# Patient Record
Sex: FEMALE | Race: BLACK OR AFRICAN AMERICAN | NOT HISPANIC OR LATINO | URBAN - METROPOLITAN AREA
[De-identification: names, ages, dates, MRNs, and addresses within clinical notes are randomized per-mention and may not be internally consistent; named-entity substitution may affect disease eponyms.]

---

## 2023-10-07 ENCOUNTER — EMERGENCY (EMERGENCY)
Facility: HOSPITAL | Age: 33
LOS: 1 days | Discharge: HOME | End: 2023-10-07
Attending: EMERGENCY MEDICINE
Payer: MEDICAID

## 2023-10-07 VITALS
RESPIRATION RATE: 17 BRPM | DIASTOLIC BLOOD PRESSURE: 106 MMHG | TEMPERATURE: 98 F | SYSTOLIC BLOOD PRESSURE: 141 MMHG | HEART RATE: 77 BPM | OXYGEN SATURATION: 100 % | HEIGHT: 63 IN | WEIGHT: 154.98 LBS

## 2023-10-07 PROCEDURE — 99282 EMERGENCY DEPT VISIT SF MDM: CPT

## 2023-10-07 PROCEDURE — 99283 EMERGENCY DEPT VISIT LOW MDM: CPT

## 2023-10-07 NOTE — ED PROVIDER NOTE - PHYSICAL EXAMINATION
Awake alert talking no acute distress.  Facial symmetry.  There is no swelling to the neck.  The right lower wisdom tooth appears slightly fractured and there is no swelling to the area.  The gums are normal in appearance.  The other teeth are also normal in appearance.

## 2023-10-07 NOTE — ED PROVIDER NOTE - NSFOLLOWUPCLINICS_GEN_ALL_ED_FT
[facility]  Dental  [address] [place] [id number]  Phone: [phone]  Fax:

## 2023-10-07 NOTE — ED PROVIDER NOTE - CLINICAL SUMMARY MEDICAL DECISION MAKING FREE TEXT BOX
Patient requires wisdom tooth extraction.  There is no sign of acute infection.  I discussed this with the patient I offered to do inferior alveolar block and she declined.  I advised that if she wants to get a second opinion which she was requesting she can follow-up with the dental clinic.  Otherwise there is no sign of an acute infection which required incision and drainage or IV antibiotics.  We will discharge the patient.

## 2023-10-07 NOTE — ED PROVIDER NOTE - NSFOLLOWUPINSTRUCTIONS_ED_ALL_ED_FT
IMPORTANT INSTRUCTIONS FROM Dr. ALAN:    Please follow up with your personal dentist or our dental clinic in 24-48 hours.   Bring results from today to your visit.    If you were advised to take any medications - be sure to review the package insert.    If your symptoms change, get worse or if you have any new symptoms, come to the ER right away.  If you have any questions, call the ER at the phone number on this page.

## 2023-10-07 NOTE — ED PROVIDER NOTE - PATIENT PORTAL LINK FT
You can access the FollowMyHealth Patient Portal offered by [facility] by registering at the following website: [email or web address]. By joining [facility]’s FollowMyHealth portal, you will also be able to view your health information using other applications (apps) compatible with our system.

## 2023-10-07 NOTE — ED PROVIDER NOTE - OBJECTIVE STATEMENT
This is a 32-year-old female otherwise well who has a right lower wisdom tooth which the process for extraction had been started by her dentist to 3 days ago.  Since then she still having a lot of pain.  They shave her gum but they were not able to completely remove the tooth and they can continue next week.  She has been taking Motrin Tylenol with some relief.  There is no swelling or fevers and she is on antibiotics.